# Patient Record
Sex: FEMALE | Race: WHITE | Employment: FULL TIME | URBAN - METROPOLITAN AREA
[De-identification: names, ages, dates, MRNs, and addresses within clinical notes are randomized per-mention and may not be internally consistent; named-entity substitution may affect disease eponyms.]

---

## 2023-04-14 ENCOUNTER — HOSPITAL ENCOUNTER (EMERGENCY)
Facility: CLINIC | Age: 38
Discharge: HOME | End: 2023-04-14
Attending: EMERGENCY MEDICINE
Payer: COMMERCIAL

## 2023-04-14 VITALS
HEIGHT: 68 IN | BODY MASS INDEX: 28.79 KG/M2 | OXYGEN SATURATION: 99 % | HEART RATE: 111 BPM | SYSTOLIC BLOOD PRESSURE: 171 MMHG | DIASTOLIC BLOOD PRESSURE: 115 MMHG | TEMPERATURE: 100 F | WEIGHT: 190 LBS | RESPIRATION RATE: 16 BRPM

## 2023-04-14 DIAGNOSIS — J02.0 STREPTOCOCCAL SORE THROAT: Primary | ICD-10-CM

## 2023-04-14 LAB
S PYO AG THROAT QL: POSITIVE
SOURCE: ABNORMAL

## 2023-04-14 PROCEDURE — 6360000002 HC RX W HCPCS

## 2023-04-14 PROCEDURE — 99284 EMERGENCY DEPT VISIT MOD MDM: CPT

## 2023-04-14 PROCEDURE — 87880 STREP A ASSAY W/OPTIC: CPT

## 2023-04-14 PROCEDURE — 96372 THER/PROPH/DIAG INJ SC/IM: CPT

## 2023-04-14 RX ORDER — KETOROLAC TROMETHAMINE 30 MG/ML
30 INJECTION, SOLUTION INTRAMUSCULAR; INTRAVENOUS ONCE
Status: COMPLETED | OUTPATIENT
Start: 2023-04-14 | End: 2023-04-14

## 2023-04-14 RX ADMIN — PENICILLIN G BENZATHINE 1.2 MILLION UNITS: 1200000 INJECTION, SUSPENSION INTRAMUSCULAR at 21:36

## 2023-04-14 RX ADMIN — KETOROLAC TROMETHAMINE 30 MG: 30 INJECTION, SOLUTION INTRAMUSCULAR at 21:36

## 2023-04-14 ASSESSMENT — ENCOUNTER SYMPTOMS
COUGH: 0
SINUS PRESSURE: 0
CONSTIPATION: 0
SORE THROAT: 1
VOICE CHANGE: 0
ABDOMINAL PAIN: 0
CHEST TIGHTNESS: 0
SINUS PAIN: 0
DIARRHEA: 0
VOMITING: 0
SHORTNESS OF BREATH: 0
NAUSEA: 0
TROUBLE SWALLOWING: 0
BACK PAIN: 0

## 2023-04-14 ASSESSMENT — PAIN SCALES - GENERAL: PAINLEVEL_OUTOF10: 7

## 2023-04-14 ASSESSMENT — PAIN - FUNCTIONAL ASSESSMENT: PAIN_FUNCTIONAL_ASSESSMENT: 0-10

## 2023-04-15 NOTE — DISCHARGE INSTRUCTIONS
For pain use acetaminophen (Tylenol) or ibuprofen (Motrin / Advil), unless prescribed medications that have acetaminophen or ibuprofen (or similar medications) in it. You can take over the counter acetaminophen tablets (1 - 2 tablets of the 500-mg strength every 6 hours) or ibuprofen tablets (2 tablets every 4 hours). Mix 1 teaspoon (5 ml) of Maalox with 1 teaspoon (5 ml) of liquid Benadryl, gargle for 1 minute then swallow. You can also use Cepacol lozenge or Chloraseptic spray to help soothe your throat. If you were diagnosed with strep throat, make sure that you throw your toothbrush away. PLEASE RETURN TO THE EMERGENCY DEPARTMENT IMMEDIATELY for worsening symptoms, difficulty with swallowing foods or liquids, shortness of breath, wheezing, change in your voice, or if you develop any concerning symptoms such as: high fever not relieved by acetaminophen (Tylenol) and/or ibuprofen (Motrin / Advil), chills, shortness of breath, chest pain, feeling of your heart fluttering or racing, persistent nausea and/or vomiting, vomiting up blood, blood in your stool, loss of consciousness, numbness, weakness or tingling in the arms or legs or change in color of the extremities, changes in mental status, persistent headache, blurry vision, loss of bladder / bowel control, unable to follow up with your physician, or other any other care or concern.

## 2023-04-15 NOTE — ED PROVIDER NOTES
[address] [facility] E ED  eMERGENCY dEPARTMENT eNCOUnter   Independent Attestation     Pt Name: Donnie Gasca  MRN: [id number]  [place] 1985  Date of evaluation: 4/14/23       Donnie Gasca is a 40 y.o. female who presents with Pharyngitis (Trouble swallowing, body aches, chills, started today, )        Based on the medical record, the care appears appropriate. I was personally available for consultation in the Emergency Department.     Sujatha Hilliard DO  Attending Emergency  Physician               Sujatha Hilliard DO  04/14/23 [id number]
Skin is warm and dry. Capillary Refill: Capillary refill takes less than 2 seconds. Coloration: Skin is not jaundiced or pale. Findings: No erythema or rash. Neurological:      General: No focal deficit present. Mental Status: She is alert and oriented to person, place, and time. DIFFERENTIAL DIAGNOSIS / MDM   Patient presents ED with complaint described above. Normal heart sounds, patient mildly tachycardic, low-grade fever. Lung fields clear throughout, respirations even and unlabored. 99% on room air. Patient's abdomen is soft and nontender, no CVA tenderness. Patient has cervical adenopathy, her posterior oropharynx is erythematous, tonsillar exudate. No evidence of peritonsillar abscess. Patient tolerating her own secretions without difficulty, speaking in clear complete sentences without difficulty. Patient states she is able to drink water without issue at home. Patient's TMs are intact bilaterally, without erythema or effusion. No rashes, patient denies dysuria or vaginal complaints. Plan for strep swab. PLAN (LABS / IMAGING / EKG):  Orders Placed This Encounter   Procedures    STREP SCREEN GROUP A THROAT       MEDICATIONS ORDERED:  Orders Placed This Encounter   Medications    penicillin G benzathine (BICILLIN L-A) 2160777 UNIT/2ML 1.2 Million Units     Order Specific Question:   Please select a reason the therapeutic interchange was not accepted: Answer:   Obinna Barragan for Pharmacy to Substitute     Order Specific Question:   Antimicrobial Indications     Answer:   Head and Neck Infection    ketorolac (TORADOL) injection 30 mg       DIAGNOSTIC RESULTS     EKG: All EKG's are interpreted by the Emergency Department Physician who either signs or Co-signs this chart in the absenceof a cardiologist.      RADIOLOGY: All images are read by the radiologist and their interpretations are reviewed.     Not indicated    LABS:  Results for orders placed or performed during the